# Patient Record
Sex: MALE
[De-identification: names, ages, dates, MRNs, and addresses within clinical notes are randomized per-mention and may not be internally consistent; named-entity substitution may affect disease eponyms.]

---

## 2022-09-16 PROBLEM — Z00.00 ENCOUNTER FOR PREVENTIVE HEALTH EXAMINATION: Status: ACTIVE | Noted: 2022-09-16

## 2022-09-20 ENCOUNTER — APPOINTMENT (OUTPATIENT)
Dept: UROLOGY | Facility: CLINIC | Age: 32
End: 2022-09-20

## 2022-09-20 DIAGNOSIS — N43.3 HYDROCELE, UNSPECIFIED: ICD-10-CM

## 2022-09-20 PROCEDURE — 99203 OFFICE O/P NEW LOW 30 MIN: CPT

## 2022-09-20 NOTE — HISTORY OF PRESENT ILLNESS
[FreeTextEntry1] : CC: Enlarged testicle (right) \par \par Duration for "few years"\par \par He recently had a biopsy of a thigh mass; 1-2 months ago.  He informs me this was benign. 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

## 2022-09-20 NOTE — PHYSICAL EXAM
[Urethral Meatus] : meatus normal [Penis Abnormality] : normal circumcised penis [Urinary Bladder Findings] : the bladder was normal on palpation [Scrotum] : the scrotum was normal [Testes Mass (___cm)] : there were no testicular masses [FreeTextEntry1] : right moderate hydrocele that transilluminates; left testicle normal; right cannot palpate

## 2022-09-20 NOTE — ASSESSMENT
[FreeTextEntry1] : Diagnosis: hydrocele\par \par Plan\par Ultrasound to confirm, assess testis health\par Patient with right hydrocele that is moderately bothersome to him at this time.  We will get an US.  He is not yet wanting to proceed with hydrocelectomy. \par \par We discussed the natural history of hydrocele, the options of observation, aspiration and hydrocelectomy.  With respect to surgery we reviewed risks of wound complications, infection, abscess, ecchymosis, swelling, pain, atrophy, injury to epididymis, vasal obstruction or injury, hematoma, infected hematoma, bleeding, infertility issues, recurrence and other complications.\par \par Duane Mendoza MD, FRCS \par  of Urology [facility]\par Director of Laparoscopic and Robotic Surgery \par [facility] Director of Urology, [facility] \par Professor of Urology\par \par (Office) [phone]\par (Cell)  [phone] \par [email or web address]\par \par \par

## 2022-11-15 ENCOUNTER — NON-APPOINTMENT (OUTPATIENT)
Age: 32
End: 2022-11-15